# Patient Record
Sex: MALE | Employment: UNEMPLOYED | URBAN - METROPOLITAN AREA
[De-identification: names, ages, dates, MRNs, and addresses within clinical notes are randomized per-mention and may not be internally consistent; named-entity substitution may affect disease eponyms.]

---

## 2024-08-01 ENCOUNTER — HOSPITAL ENCOUNTER (EMERGENCY)
Facility: HOSPITAL | Age: 29
Discharge: HOME | End: 2024-08-01
Attending: EMERGENCY MEDICINE
Payer: COMMERCIAL

## 2024-08-01 VITALS
SYSTOLIC BLOOD PRESSURE: 151 MMHG | OXYGEN SATURATION: 99 % | RESPIRATION RATE: 16 BRPM | WEIGHT: 158.95 LBS | DIASTOLIC BLOOD PRESSURE: 83 MMHG | HEART RATE: 83 BPM | TEMPERATURE: 98.7 F

## 2024-08-01 DIAGNOSIS — S09.90XA INJURY OF HEAD, INITIAL ENCOUNTER: ICD-10-CM

## 2024-08-01 DIAGNOSIS — S01.81XA FACIAL LACERATION, INITIAL ENCOUNTER: Primary | ICD-10-CM

## 2024-08-01 PROCEDURE — 99284 EMERGENCY DEPT VISIT MOD MDM: CPT | Performed by: EMERGENCY MEDICINE

## 2024-08-01 PROCEDURE — 90715 TDAP VACCINE 7 YRS/> IM: CPT | Performed by: EMERGENCY MEDICINE

## 2024-08-01 PROCEDURE — 12013 RPR F/E/E/N/L/M 2.6-5.0 CM: CPT | Performed by: EMERGENCY MEDICINE

## 2024-08-01 PROCEDURE — 90471 IMMUNIZATION ADMIN: CPT

## 2024-08-01 PROCEDURE — 99283 EMERGENCY DEPT VISIT LOW MDM: CPT

## 2024-08-01 RX ORDER — CEPHALEXIN 500 MG/1
500 CAPSULE ORAL EVERY 6 HOURS SCHEDULED
Qty: 12 CAPSULE | Refills: 0 | Status: SHIPPED | OUTPATIENT
Start: 2024-08-01 | End: 2024-08-04

## 2024-08-01 RX ORDER — LIDOCAINE HYDROCHLORIDE 10 MG/ML
5 INJECTION, SOLUTION EPIDURAL; INFILTRATION; INTRACAUDAL; PERINEURAL ONCE
Status: COMPLETED | OUTPATIENT
Start: 2024-08-01 | End: 2024-08-01

## 2024-08-01 RX ORDER — CEPHALEXIN 250 MG/1
500 CAPSULE ORAL ONCE
Status: COMPLETED | OUTPATIENT
Start: 2024-08-01 | End: 2024-08-01

## 2024-08-01 RX ORDER — ACETAMINOPHEN 325 MG/1
975 TABLET ORAL ONCE
Status: COMPLETED | OUTPATIENT
Start: 2024-08-01 | End: 2024-08-01

## 2024-08-01 RX ADMIN — LIDOCAINE HYDROCHLORIDE 5 ML: 10 INJECTION, SOLUTION EPIDURAL; INFILTRATION; INTRACAUDAL; PERINEURAL at 16:00

## 2024-08-01 RX ADMIN — TETANUS TOXOID, REDUCED DIPHTHERIA TOXOID AND ACELLULAR PERTUSSIS VACCINE, ADSORBED 0.5 ML: 5; 2.5; 8; 8; 2.5 SUSPENSION INTRAMUSCULAR at 16:00

## 2024-08-01 RX ADMIN — CEPHALEXIN 500 MG: 250 CAPSULE ORAL at 16:28

## 2024-08-01 RX ADMIN — ACETAMINOPHEN 975 MG: 325 TABLET, FILM COATED ORAL at 16:28

## 2024-08-01 NOTE — ED PROVIDER NOTES
History  Chief Complaint   Patient presents with    Facial Injury     Pt was at work and a piece of wood fell and hit him on the face. Pt reports no LOC, did not fall, and no blood thinners.Injury wound on his L side of face.     Patient reports he was struck in the face by a piece of wood that fell from a roof while at work.  The piece of wood was approximately 3 feet long.  It struck the side of his face.  It was intact, and he is not suspicious for foreign body.  He reports he did not feel dazed or confused.  He did not fall to the ground.  He does not feel nauseous and has had not had vomiting.  He does not take any blood thinners.  He denies any neck pain.  No tingling or numbness.  No weakness.  He reports his last tetanus vaccine was approximately 7 years ago.       used: Yes    Facial Injury      None       History reviewed. No pertinent past medical history.    History reviewed. No pertinent surgical history.    History reviewed. No pertinent family history.  I have reviewed and agree with the history as documented.    E-Cigarette/Vaping    E-Cigarette Use Never User      E-Cigarette/Vaping Substances     Social History     Tobacco Use    Smoking status: Never    Smokeless tobacco: Never   Vaping Use    Vaping status: Never Used   Substance Use Topics    Alcohol use: Not Currently    Drug use: Never       Review of Systems   All other systems reviewed and are negative.      Physical Exam  Physical Exam  Vitals and nursing note reviewed.   Constitutional:       General: He is not in acute distress.     Appearance: He is well-developed.   HENT:      Head: Normocephalic.      Comments: Facial laceration on left cheek and behind the left ear.  Eyes:      Conjunctiva/sclera: Conjunctivae normal.   Neck:      Comments: Full range of motion without pain.  Cardiovascular:      Rate and Rhythm: Normal rate and regular rhythm.      Heart sounds: No murmur heard.  Pulmonary:      Effort: Pulmonary  effort is normal. No respiratory distress.      Breath sounds: Normal breath sounds.   Abdominal:      Palpations: Abdomen is soft.      Tenderness: There is no abdominal tenderness.   Musculoskeletal:         General: No swelling.      Cervical back: Neck supple. No tenderness.   Skin:     General: Skin is warm and dry.      Capillary Refill: Capillary refill takes less than 2 seconds.   Neurological:      General: No focal deficit present.      Mental Status: He is alert and oriented to person, place, and time.      Cranial Nerves: No cranial nerve deficit.      Motor: No weakness.   Psychiatric:         Mood and Affect: Mood normal.         Vital Signs  ED Triage Vitals   Temperature Pulse Respirations Blood Pressure SpO2   08/01/24 1529 08/01/24 1529 08/01/24 1529 08/01/24 1529 08/01/24 1529   98.7 °F (37.1 °C) 83 16 151/83 99 %      Temp Source Heart Rate Source Patient Position - Orthostatic VS BP Location FiO2 (%)   08/01/24 1529 08/01/24 1529 08/01/24 1529 08/01/24 1529 --   Oral Monitor Lying Right arm       Pain Score       08/01/24 1628       4           Vitals:    08/01/24 1529   BP: 151/83   Pulse: 83   Patient Position - Orthostatic VS: Lying         Visual Acuity      ED Medications  Medications   tetanus-diphtheria-acellular pertussis (BOOSTRIX) IM injection 0.5 mL (0.5 mL Intramuscular Given 8/1/24 1600)   lidocaine (PF) (XYLOCAINE-MPF) 1 % injection 5 mL (5 mL Infiltration Given 8/1/24 1600)   acetaminophen (TYLENOL) tablet 975 mg (975 mg Oral Given 8/1/24 1628)   cephalexin (KEFLEX) capsule 500 mg (500 mg Oral Given 8/1/24 1628)       Diagnostic Studies  Results Reviewed       None                   No orders to display              Procedures  Procedures         ED Course  ED Course as of 08/01/24 2244   Thu Aug 01, 2024   1620 Wound repaired by PA with excellent approximation.  Patient reports very mild headache.  Will order Tylenol.  No severe headache to suggest need for brain imaging.                                  SBIRT 20yo+      Flowsheet Row Most Recent Value   Initial Alcohol Screen: US AUDIT-C     1. How often do you have a drink containing alcohol? 0 Filed at: 08/01/2024 1530   2. How many drinks containing alcohol do you have on a typical day you are drinking?  0 Filed at: 08/01/2024 1530   3a. Male UNDER 65: How often do you have five or more drinks on one occasion? 0 Filed at: 08/01/2024 1530   Audit-C Score 0 Filed at: 08/01/2024 1530   ADONIS: How many times in the past year have you...    Used an illegal drug or used a prescription medication for non-medical reasons? Never Filed at: 08/01/2024 1530                      Medical Decision Making  No neck imaging indicated by [place] c-spine rules.  Patient has no red flags for intracranial hemorrhage.  No LOC, no vomiting, and mechanism more of a glancing blow than a direct hit.  I believe risks of radiation outweigh benefits in assessment of ICH.  Location not suspicious for injury to parotid duct.  Mechanism not suspicious for foreign body, but will check with direct visualizaiton.    Risk  OTC drugs.  Prescription drug management.                 Disposition  Final diagnoses:   Facial laceration, initial encounter   Injury of head, initial encounter     Time reflects when diagnosis was documented in both MDM as applicable and the Disposition within this note       Time User Action Codes Description Comment    8/1/2024  3:49 PM Robert Fierro Add [S01.81XA] Facial laceration, initial encounter     8/1/2024  3:49 PM Robert Fierro Add [S09.90XA] Injury of head, initial encounter           ED Disposition       ED Disposition   Discharge    Condition   Stable    Date/Time   Thu Aug 1, 2024 1627    Comment   Ricky Hamilton discharge to home/self care.                   Follow-up Information       Follow up With Specialties Details Why Contact Info Additional Information    [facility] In 5 days  Follow-up with any urgent care or primary care physician for suture removal in 5 days. [address]  [facility] [address]  [phone] [facility], [address] [place], [place], [address]   [phone]            Discharge Medication List as of 8/1/2024  4:57 PM        START taking these medications    Details   cephalexin (KEFLEX) 500 mg capsule Take 1 capsule (500 mg total) by mouth every 6 (six) hours for 3 days, Starting Thu 8/1/2024, Until Sun 8/4/2024, Normal             No discharge procedures on file.    PDMP Review       None            ED Provider  Electronically Signed by             Robert Fierro MD  08/01/24 4995

## 2024-08-01 NOTE — ED PROCEDURE NOTE
"Procedure  Universal Protocol:  Consent: Verbal consent obtained.  Risks and benefits: risks, benefits and alternatives were discussed  Consent given by: patient  Time out: Immediately prior to procedure a \"time out\" was called to verify the correct patient, procedure, equipment, support staff and site/side marked as required.  Patient understanding: patient states understanding of the procedure being performed  Patient identity confirmed: verbally with patient  Laceration repair    Date/Time: 8/1/2024 4:29 PM    Performed by: Cynthia Saldana PA-C  Authorized by: Cynthia Saldana PA-C  Location: face, left cheek and earlobe.  Laceration length: 4.3 cm  Foreign bodies: no foreign bodies  Tendon involvement: none  Nerve involvement: none  Vascular damage: no  Anesthesia: local infiltration    Anesthesia:  Local Anesthetic: lidocaine 1% without epinephrine  Anesthetic total: 5 mL    Wound Dehiscence:  Superficial Wound Dehiscence: simple closure      Procedure Details:  Preparation: Patient was prepped and draped in the usual sterile fashion.  Irrigation solution: saline  Irrigation method: syringe  Amount of cleaning: standard  Skin closure: 6-0 nylon  Number of sutures: 9 (1. #4 sutures, 2. #2 sutures 3. #3 sutures  for total of #9 sutures)  Technique: simple  Approximation: close  Approximation difficulty: simple  Dressing: steri strips.  Patient tolerance: patient tolerated the procedure well with no immediate complications            PE FACE:   1. 2cm diagonal-vertical, deep, subcutaneous laceration noted to left cheek, anterior to left TMJ region, no foreign body, no deep structure involvement, no active bleeding.  FROM of jaw  2. 1cm diagonal laceration noted to left lower earlobe, not through and through, no foreign body, no active bleeding  3.  1.3 cm irregular diagonal subcutaneous laceration noted to posterior aspect of left earlobe, no foreign body, no deep structure involvement, no active bleeding         "   Cynthia Saldana PA-C  08/01/24 [id number]